# Patient Record
(demographics unavailable — no encounter records)

---

## 2025-02-04 NOTE — HISTORY OF PRESENT ILLNESS
[FreeTextEntry1] : CC;  Interval history: [id number]  had a coronary angioplasty for LAD 95% with severe stenosis Patient is otherwise doing well.  His son is still sick and that is a huge stressor.  Parkinsons is well controlled but apparently having some dyskinesias.   having glasses redone and continuing vision rehab at [facility]  has not done cardiovascular rehab and will get stress test on feb 12th.   Plavix and Xarelto  - will be on it for one year.    Interval history: No afib on the Holter.  taken of Brillinta by Dr. Dillon and now on Asa and started on Xarelto for elevated anticardiolipin  Taking the Xarelto on a full stomach at night time.   increased gabapentin to 600 mg qhs at bedtime for presumed   doing mata chi and starting boxing also starting to do a dancing class.

## 2025-02-04 NOTE — PHYSICAL EXAM
[FreeTextEntry1] : The patient is alert and oriented x3, naming intact x3, repetition normal, follows three-step commands, and is able to participate fully in the history taking. Speech is normal with no evidence of dysarthria. Memory is intact: Immediate recall 3 out of 3, short-term 3 out of 3, remote memory intact Cranial nerves II through XII intact with bilateral temporal field cuts.  Motor exam: Upper and lower extremities 5 out of 5 power, cogwheeling rigidity.  Sensory exam: Intact to light touch and pinprick. Romberg negative. Coordination and vestibular exam: Finger to nose intact, no evidence of truncal or appendicular ataxia. No evidence of nystagmus. no vestibular symptoms elicited with head turning during ambulation. Gait: narrow stance and gait. decreased arm swing. able to get out of chair. Reflexes: One to 2+ in upper and lower extremities. No pathological reflexes. Downgoing toes.

## 2025-02-04 NOTE — ASSESSMENT
[FreeTextEntry1] : patient has Parkinson's and stroke  following up with oJn Graf and here for their neurological care continue xarelto  mildly elevated ACL I gM  as long as he is on the xarelto he does not need a loop recorder but if he was to come off AC then I would want him to get a loop recorder.

## 2025-06-03 NOTE — PHYSICAL EXAM
[General Appearance - Alert] : alert [General Appearance - In No Acute Distress] : in no acute distress [Cranial Nerves Trigeminal (V)] : facial sensation intact symmetrically [Cranial Nerves Facial (VII)] : face symmetrical [Motor Tone] : muscle tone was normal in all four extremities [Motor Strength] : muscle strength was normal in all four extremities [Abnormal Walk] : normal gait

## 2025-06-10 NOTE — HISTORY OF PRESENT ILLNESS
[FreeTextEntry1] : JUAN MCCARTY is a 65 year-old male with a PMHx significant for DM II, CAD (complete LAD occlusion) s/p CABG x2, MI (2012), Ischemic Optic Neuritis (2014, 2015), Herpes Virus Endotheliitis (on lifelong Valtrex), Bladder Cancer (2017 s/p chemo + RT), and Parkinson's (2022), who presents to Dr. Dillon office today for f/u of L occipital stroke, R vert occlusion s/p stent and mechanical thrombectomy with review of 1 yr MRA  Brain.  11/15/23 patient was initially brought in by EMS to [facility] ED for sudden Right-sided facial droop, vision impairment, weakness, and dysarthria. NIHSS was 13 upon arrival, Stroke Code called. CT Head without acute abnormality, but CTA Head demonstrated Left P2 segment PCA occlusion. CTA Neck demonstrated R Vertebral artery V1 segment occlusion with distal reconstitution and hypoplastic L vertebral artery. CTP with ischemia in L PCA region. MRI Brain Stroke protocol showed acute L Occipital lobe infarct. Patient was outside the TNK window, so he underwent Mechanical thrombectomy for Left P2 occlusion with Dr. Dillon. TICI 3 re-canalization achieved. On 11/20/23 patient then underwent stent placement to Right Vertebral artery. Hospital course thereafter was uncomplicated, and patient discharged to [facility] on 11/27/23.  12/19/23 - Patient states he is "actually doing pretty well given the circumstances". He and wife feel that cognitively he has not returned to baseline and patient's verbal responses are occasionally delayed. Since discharge, patient has been working with VNS therapy at home, and both patient and wife feel this has been helpful for his post-stroke recovery. They are looking to possibly return to [facility] and seek additional SLP therapy. Patient does endorse some persistent numbness in his b/l feet since his stroke, but states it is mild and not progressing. Plan is to continue DAPT w Brilinta and return in Feb 2024 for symptom check.  2/22/24 - Dr. Dillon office today for symptom check. Memory loss and b/l feet numbness persist. He and wife feel like his strength and cognition improving with PT. He feels his walking is not back to baseline but improved with therapy since our last visit. Plan to obtain MRA NOVA Brain in 3 mo (May 2024).    TODAY 06/03/2025 presents for a routine MRA review. patient reports hx of cardiac stent placement 12/2024 for LAD blockage and switched off Aspirin and started on Plavix for cardiac stent and xarelto for anti cardiolipin antibody positive. PCP: N/A  Stroke Neuro: Re Roblero NP  Cardio: Dr. Claudio Aldridge  Ophthalmology: Dr. Pipo Encarnacion [address] [place] [address] [phone]

## 2025-06-10 NOTE — HISTORY OF PRESENT ILLNESS
[FreeTextEntry1] : JUAN MCCARTY is a 65 year-old male with a PMHx significant for DM II, CAD (complete LAD occlusion) s/p CABG x2, MI (2012), Ischemic Optic Neuritis (2014, 2015), Herpes Virus Endotheliitis (on lifelong Valtrex), Bladder Cancer (2017 s/p chemo + RT), and Parkinson's (2022), who presents to Dr. Dillon office today for f/u of L occipital stroke, R vert occlusion s/p stent and mechanical thrombectomy with review of 1 yr MRA  Brain.  11/15/23 patient was initially brought in by EMS to [facility] ED for sudden Right-sided facial droop, vision impairment, weakness, and dysarthria. NIHSS was 13 upon arrival, Stroke Code called. CT Head without acute abnormality, but CTA Head demonstrated Left P2 segment PCA occlusion. CTA Neck demonstrated R Vertebral artery V1 segment occlusion with distal reconstitution and hypoplastic L vertebral artery. CTP with ischemia in L PCA region. MRI Brain Stroke protocol showed acute L Occipital lobe infarct. Patient was outside the TNK window, so he underwent Mechanical thrombectomy for Left P2 occlusion with Dr. Dillon. TICI 3 re-canalization achieved. On 11/20/23 patient then underwent stent placement to Right Vertebral artery. Hospital course thereafter was uncomplicated, and patient discharged to [facility] on 11/27/23.  12/19/23 - Patient states he is "actually doing pretty well given the circumstances". He and wife feel that cognitively he has not returned to baseline and patient's verbal responses are occasionally delayed. Since discharge, patient has been working with VNS therapy at home, and both patient and wife feel this has been helpful for his post-stroke recovery. They are looking to possibly return to [facility] and seek additional SLP therapy. Patient does endorse some persistent numbness in his b/l feet since his stroke, but states it is mild and not progressing. Plan is to continue DAPT w Brilinta and return in Feb 2024 for symptom check.  2/22/24 - Dr. Dillon office today for symptom check. Memory loss and b/l feet numbness persist. He and wife feel like his strength and cognition improving with PT. He feels his walking is not back to baseline but improved with therapy since our last visit. Plan to obtain MRA NOVA Brain in 3 mo (May 2024).    TODAY 06/03/2025 presents for a routine MRA review. patient reports hx of cardiac stent placement 12/2024 for LAD blockage and switched off Aspirin and started on Plavix for cardiac stent and xarelto for anti cardiolipin antibody positive. PCP: N/A  Stroke Neuro: Re Roblero NP  Cardio: Dr. Claudoi Aldridge  Ophthalmology: Dr. Pipo Encarnacion [address] [place] [address] [phone]

## 2025-06-10 NOTE — HISTORY OF PRESENT ILLNESS
[FreeTextEntry1] : JUAN MCCARTY is a 65 year-old male with a PMHx significant for DM II, CAD (complete LAD occlusion) s/p CABG x2, MI (2012), Ischemic Optic Neuritis (2014, 2015), Herpes Virus Endotheliitis (on lifelong Valtrex), Bladder Cancer (2017 s/p chemo + RT), and Parkinson's (2022), who presents to Dr. Dillon office today for f/u of L occipital stroke, R vert occlusion s/p stent and mechanical thrombectomy with review of 1 yr MRA  Brain.  11/15/23 patient was initially brought in by EMS to [facility] ED for sudden Right-sided facial droop, vision impairment, weakness, and dysarthria. NIHSS was 13 upon arrival, Stroke Code called. CT Head without acute abnormality, but CTA Head demonstrated Left P2 segment PCA occlusion. CTA Neck demonstrated R Vertebral artery V1 segment occlusion with distal reconstitution and hypoplastic L vertebral artery. CTP with ischemia in L PCA region. MRI Brain Stroke protocol showed acute L Occipital lobe infarct. Patient was outside the TNK window, so he underwent Mechanical thrombectomy for Left P2 occlusion with Dr. Dillon. TICI 3 re-canalization achieved. On 11/20/23 patient then underwent stent placement to Right Vertebral artery. Hospital course thereafter was uncomplicated, and patient discharged to [facility] on 11/27/23.  12/19/23 - Patient states he is "actually doing pretty well given the circumstances". He and wife feel that cognitively he has not returned to baseline and patient's verbal responses are occasionally delayed. Since discharge, patient has been working with VNS therapy at home, and both patient and wife feel this has been helpful for his post-stroke recovery. They are looking to possibly return to [facility] and seek additional SLP therapy. Patient does endorse some persistent numbness in his b/l feet since his stroke, but states it is mild and not progressing. Plan is to continue DAPT w Brilinta and return in Feb 2024 for symptom check.  2/22/24 - Dr. Dillon office today for symptom check. Memory loss and b/l feet numbness persist. He and wife feel like his strength and cognition improving with PT. He feels his walking is not back to baseline but improved with therapy since our last visit. Plan to obtain MRA NOVA Brain in 3 mo (May 2024).    TODAY 06/03/2025 presents for a routine MRA review. patient reports hx of cardiac stent placement 12/2024 for LAD blockage and switched off Aspirin and started on Plavix for cardiac stent and xarelto for anti cardiolipin antibody positive. PCP: N/A  Stroke Neuro: Re Roblero NP  Cardio: Dr. Claudio Aldridge  Ophthalmology: Dr. Pipo Encarnacion [address] [place] [address] [phone]

## 2025-06-10 NOTE — ASSESSMENT
[FreeTextEntry1] : JUAN MCCARTY is a 64 year-old male who presents to Dr. Dillon office today for f/u of L occipital stroke, R vert occlusion s/p stent and mechanical thrombectomy with review of 1 year MRA Brain.   PLAN: - MRA Head and Neck NOVA Protocol - continue with plavix and xarelto as per Bessie. - return to Dr. Dillon clinic to review  The patient was instructed that if they should immediately call 911 or go to the Emergency Department if they experience symptoms of severe thunderclap headache, syncope, unexplained nausea/vomiting, visual changes, seizure-like activity, new weakness or numbness of extremities.   Patient verbalizes agreement and understanding with plan of care.  I, Dr. Dillon, personally performed the evaluation and management (E/M) services for this established patient who presents today with (a) new problem(s)/exacerbation of (an) existing condition(s). That E/M includes conducting the examination, assessing all new/exacerbated conditions, and establishing a new plan of care. Today, Jazmine Fernández, was here to observe my evaluation and management services for this new problem/exacerbated condition to be followed going forward.